# Patient Record
Sex: FEMALE | Race: WHITE | NOT HISPANIC OR LATINO | URBAN - METROPOLITAN AREA
[De-identification: names, ages, dates, MRNs, and addresses within clinical notes are randomized per-mention and may not be internally consistent; named-entity substitution may affect disease eponyms.]

---

## 2022-07-20 ENCOUNTER — OUTPATIENT (OUTPATIENT)
Dept: OUTPATIENT SERVICES | Facility: HOSPITAL | Age: 22
LOS: 1 days | End: 2022-07-20
Payer: COMMERCIAL

## 2022-07-20 VITALS
TEMPERATURE: 98 F | DIASTOLIC BLOOD PRESSURE: 77 MMHG | OXYGEN SATURATION: 98 % | WEIGHT: 255.07 LBS | HEART RATE: 87 BPM | HEIGHT: 69 IN | RESPIRATION RATE: 14 BRPM | SYSTOLIC BLOOD PRESSURE: 111 MMHG

## 2022-07-20 DIAGNOSIS — K08.409 PARTIAL LOSS OF TEETH, UNSPECIFIED CAUSE, UNSPECIFIED CLASS: Chronic | ICD-10-CM

## 2022-07-20 DIAGNOSIS — J35.01 CHRONIC TONSILLITIS: ICD-10-CM

## 2022-07-20 DIAGNOSIS — Z01.818 ENCOUNTER FOR OTHER PREPROCEDURAL EXAMINATION: ICD-10-CM

## 2022-07-20 LAB
ANION GAP SERPL CALC-SCNC: 5 MMOL/L — SIGNIFICANT CHANGE UP (ref 5–17)
APTT BLD: 33 SEC — SIGNIFICANT CHANGE UP (ref 27.5–35.5)
BUN SERPL-MCNC: 9 MG/DL — SIGNIFICANT CHANGE UP (ref 7–23)
CALCIUM SERPL-MCNC: 8.7 MG/DL — SIGNIFICANT CHANGE UP (ref 8.5–10.1)
CHLORIDE SERPL-SCNC: 107 MMOL/L — SIGNIFICANT CHANGE UP (ref 96–108)
CO2 SERPL-SCNC: 29 MMOL/L — SIGNIFICANT CHANGE UP (ref 22–31)
CREAT SERPL-MCNC: 0.76 MG/DL — SIGNIFICANT CHANGE UP (ref 0.5–1.3)
EGFR: 116 ML/MIN/1.73M2 — SIGNIFICANT CHANGE UP
GLUCOSE SERPL-MCNC: 85 MG/DL — SIGNIFICANT CHANGE UP (ref 70–99)
HCG SERPL-ACNC: <1 MIU/ML — SIGNIFICANT CHANGE UP
HCT VFR BLD CALC: 40.8 % — SIGNIFICANT CHANGE UP (ref 34.5–45)
HGB BLD-MCNC: 12.9 G/DL — SIGNIFICANT CHANGE UP (ref 11.5–15.5)
INR BLD: 1.08 RATIO — SIGNIFICANT CHANGE UP (ref 0.88–1.16)
MCHC RBC-ENTMCNC: 26.1 PG — LOW (ref 27–34)
MCHC RBC-ENTMCNC: 31.6 GM/DL — LOW (ref 32–36)
MCV RBC AUTO: 82.4 FL — SIGNIFICANT CHANGE UP (ref 80–100)
NRBC # BLD: 0 /100 WBCS — SIGNIFICANT CHANGE UP (ref 0–0)
PLATELET # BLD AUTO: 433 K/UL — HIGH (ref 150–400)
POTASSIUM SERPL-MCNC: 3.9 MMOL/L — SIGNIFICANT CHANGE UP (ref 3.5–5.3)
POTASSIUM SERPL-SCNC: 3.9 MMOL/L — SIGNIFICANT CHANGE UP (ref 3.5–5.3)
PROTHROM AB SERPL-ACNC: 12.6 SEC — SIGNIFICANT CHANGE UP (ref 10.5–13.4)
RBC # BLD: 4.95 M/UL — SIGNIFICANT CHANGE UP (ref 3.8–5.2)
RBC # FLD: 13.4 % — SIGNIFICANT CHANGE UP (ref 10.3–14.5)
SARS-COV-2 RNA SPEC QL NAA+PROBE: SIGNIFICANT CHANGE UP
SODIUM SERPL-SCNC: 141 MMOL/L — SIGNIFICANT CHANGE UP (ref 135–145)
WBC # BLD: 10.1 K/UL — SIGNIFICANT CHANGE UP (ref 3.8–10.5)
WBC # FLD AUTO: 10.1 K/UL — SIGNIFICANT CHANGE UP (ref 3.8–10.5)

## 2022-07-20 PROCEDURE — 36415 COLL VENOUS BLD VENIPUNCTURE: CPT

## 2022-07-20 PROCEDURE — U0003: CPT

## 2022-07-20 PROCEDURE — G0463: CPT

## 2022-07-20 PROCEDURE — 85730 THROMBOPLASTIN TIME PARTIAL: CPT

## 2022-07-20 PROCEDURE — 85027 COMPLETE CBC AUTOMATED: CPT

## 2022-07-20 PROCEDURE — 80048 BASIC METABOLIC PNL TOTAL CA: CPT

## 2022-07-20 PROCEDURE — 84702 CHORIONIC GONADOTROPIN TEST: CPT

## 2022-07-20 PROCEDURE — U0005: CPT

## 2022-07-20 PROCEDURE — 85610 PROTHROMBIN TIME: CPT

## 2022-07-20 NOTE — H&P PST ADULT - LAST STRESS TEST
Thinks she had stress test  a few years ago - with Dr Edwin Diaz done due to palpitations and it was fine

## 2022-07-20 NOTE — H&P PST ADULT - PROBLEM SELECTOR PLAN 1
PST labs; CBC, BMP, Coags, HcG, COVID-19 PCR. Medical clearance on chart. Pt instructed to stop any NSAIDS/Herbal Supplements between now and procedure. May take Tylenol if needed for any pain between now and procedure. Morning of procedure she may take her Zyrtec with small sip of water. Pre-op instructions given to pt with understanding verbalized. All questions addressed with patient prior to her leaving the PST department today.

## 2022-07-20 NOTE — H&P PST ADULT - FALL HARM RISK - UNIVERSAL INTERVENTIONS
Bed in lowest position, wheels locked, appropriate side rails in place/Call bell, personal items and telephone in reach/Instruct patient to call for assistance before getting out of bed or chair/Non-slip footwear when patient is out of bed/[place] to call system/Physically safe environment - no spills, clutter or unnecessary equipment/Purposeful Proactive Rounding/Room/bathroom lighting operational, light cord in reach

## 2022-07-20 NOTE — H&P PST ADULT - NSICDXFAMILYHX_GEN_ALL_CORE_FT
FAMILY HISTORY:  Father  Still living? Yes, Estimated age: 41-50  Family history of hypercholesterolemia, Age at diagnosis: Age Unknown    Mother  Still living? Yes, Estimated age: 41-50  Family history of attention deficit hyperactivity disorder (ADHD), Age at diagnosis: Age Unknown

## 2022-07-20 NOTE — H&P PST ADULT - REASON FOR ADMISSION
Pt states " I am having a Tonsillectomy." Path Notes (To The Dermatopathologist): check margins\\nsuture lateral pole

## 2022-07-20 NOTE — H&P PST ADULT - STREET DRUG/MEDICATION/INHALANT, DURATION OF USE, PROFILE
Notes she stopped any Marijuana 3 months ago - prior to that she would use edibles once a month (notes was using it for pain and appetite)

## 2022-07-20 NOTE — H&P PST ADULT - NSICDXPASTMEDICALHX_GEN_ALL_CORE_FT
PAST MEDICAL HISTORY:  Anemia     Back pain     Bilateral dry eyes     Chronic tonsillitis     Dizziness     Fibromyalgia     Gastritis     H/O irritable bowel syndrome     Headache     Heartburn     High blood pressure Notes back in 2017 when she was in the hospital for  depression/anxiety  which it has since resolved    History of heart murmur in childhood     Migraines     Muscle cramps     Nausea     Numbness Notes Intermittent Numbness in arms and legs    HALLIE (obstructive sleep apnea) Does NOT currently use a CPAP machine    Palpitations     PCOS (polycystic ovarian syndrome)     Thyroid nodule     Vertigo

## 2022-07-20 NOTE — H&P PST ADULT - ASSESSMENT
21 year old female PMH HALLIE (DX may 2021 -does not use CPAP Machine), PCOS, Fibromyalgia, IBS, Thyroid Nodule, HTN (notes has since resolved), Childhood Heart Murmur, SOB (Due to Enlarged Tonsils and cat allergy- lives with cats), Vertigo, Migraines, Dry Eyes, Anemia, Heartburn, Back Pain; also with Chronic Tonsillitis; scheduled for Tonsillectomy with Dr Gigi Donohue on 7/25/2022.

## 2022-07-20 NOTE — H&P PST ADULT - RESPIRATORY
clear to auscultation bilaterally/no respiratory distress/airway patent/breath sounds equal/good air movement/respirations non-labored

## 2022-07-20 NOTE — H&P PST ADULT - HISTORY OF PRESENT ILLNESS
21 year old female PMH HALLIE (DX may 2021 -does not use CPAP Machine), PCOS, Fibromyalgia, IBS, Thyroid Nodule, HTN (notes has since resolved), Childhood Heart Murmur, SOB (Due to Enlarged Tonsils and cat allergy- lives with cats), Vertigo, Migraines, Dry Eyes, Anemia, Heartburn, Back Pain; also with Chronic Tonsillitis; presents today for PST prior to Tonsillectomy with Dr Gigi Donohue on 7/25/2022.  Pt notes H/O [place] Tonsillitis as well as enlarged tonsils which she notes makes it difficult to breath. Following consult, exam and discussions with Dr Donohue regarding treatment options pt is electing for scheduled procedure.

## 2022-07-25 ENCOUNTER — OUTPATIENT (OUTPATIENT)
Dept: OUTPATIENT SERVICES | Facility: HOSPITAL | Age: 22
LOS: 1 days | End: 2022-07-25
Payer: COMMERCIAL

## 2022-07-25 VITALS
HEART RATE: 82 BPM | RESPIRATION RATE: 18 BRPM | OXYGEN SATURATION: 99 % | DIASTOLIC BLOOD PRESSURE: 71 MMHG | SYSTOLIC BLOOD PRESSURE: 139 MMHG | TEMPERATURE: 98 F

## 2022-07-25 VITALS
OXYGEN SATURATION: 98 % | HEART RATE: 75 BPM | DIASTOLIC BLOOD PRESSURE: 52 MMHG | SYSTOLIC BLOOD PRESSURE: 117 MMHG | RESPIRATION RATE: 13 BRPM

## 2022-07-25 DIAGNOSIS — K08.409 PARTIAL LOSS OF TEETH, UNSPECIFIED CAUSE, UNSPECIFIED CLASS: Chronic | ICD-10-CM

## 2022-07-25 DIAGNOSIS — J35.01 CHRONIC TONSILLITIS: ICD-10-CM

## 2022-07-25 PROCEDURE — 88304 TISSUE EXAM BY PATHOLOGIST: CPT

## 2022-07-25 PROCEDURE — 42826 REMOVAL OF TONSILS: CPT

## 2022-07-25 PROCEDURE — C1889: CPT

## 2022-07-25 PROCEDURE — 88304 TISSUE EXAM BY PATHOLOGIST: CPT | Mod: 26

## 2022-07-25 DEVICE — SURGIFLO MATRIX WITH THROMBIN KIT: Type: IMPLANTABLE DEVICE | Site: BILATERAL | Status: FUNCTIONAL

## 2022-07-25 RX ORDER — ONDANSETRON 8 MG/1
4 TABLET, FILM COATED ORAL ONCE
Refills: 0 | Status: DISCONTINUED | OUTPATIENT
Start: 2022-07-25 | End: 2022-07-25

## 2022-07-25 RX ORDER — SODIUM CHLORIDE 9 MG/ML
1000 INJECTION, SOLUTION INTRAVENOUS
Refills: 0 | Status: DISCONTINUED | OUTPATIENT
Start: 2022-07-25 | End: 2022-07-25

## 2022-07-25 RX ORDER — HYDROMORPHONE HYDROCHLORIDE 2 MG/ML
0.5 INJECTION INTRAMUSCULAR; INTRAVENOUS; SUBCUTANEOUS
Refills: 0 | Status: DISCONTINUED | OUTPATIENT
Start: 2022-07-25 | End: 2022-07-25

## 2022-07-25 RX ORDER — FLUTICASONE PROPIONATE 50 MCG
2 SPRAY, SUSPENSION NASAL
Qty: 0 | Refills: 0 | DISCHARGE

## 2022-07-25 RX ORDER — ACETAMINOPHEN 500 MG
1000 TABLET ORAL ONCE
Refills: 0 | Status: COMPLETED | OUTPATIENT
Start: 2022-07-25 | End: 2022-07-25

## 2022-07-25 RX ORDER — CETIRIZINE HYDROCHLORIDE 10 MG/1
1 TABLET ORAL
Qty: 0 | Refills: 0 | DISCHARGE

## 2022-07-25 RX ORDER — HYDROMORPHONE HYDROCHLORIDE 2 MG/ML
1 INJECTION INTRAMUSCULAR; INTRAVENOUS; SUBCUTANEOUS
Refills: 0 | Status: DISCONTINUED | OUTPATIENT
Start: 2022-07-25 | End: 2022-07-25

## 2022-07-25 RX ORDER — AMOXICILLIN 250 MG/5ML
10 SUSPENSION, RECONSTITUTED, ORAL (ML) ORAL
Qty: 0 | Refills: 0 | DISCHARGE

## 2022-07-25 RX ORDER — CEFAZOLIN SODIUM 1 G
2000 VIAL (EA) INJECTION ONCE
Refills: 0 | Status: DISCONTINUED | OUTPATIENT
Start: 2022-07-25 | End: 2022-08-08

## 2022-07-25 RX ORDER — DIPHENHYDRAMINE HCL 50 MG
50 CAPSULE ORAL
Qty: 0 | Refills: 0 | DISCHARGE

## 2022-07-25 RX ORDER — ACETAMINOPHEN 500 MG
2 TABLET ORAL
Qty: 0 | Refills: 0 | DISCHARGE

## 2022-07-25 RX ADMIN — Medication 400 MILLIGRAM(S): at 10:03

## 2022-07-25 RX ADMIN — SODIUM CHLORIDE 75 MILLILITER(S): 9 INJECTION, SOLUTION INTRAVENOUS at 08:59

## 2022-07-25 NOTE — ASU DISCHARGE PLAN (ADULT/PEDIATRIC) - NS MD DC FALL RISK RISK
For information on Fall & Injury Prevention, visit: [email or web address] OR  [email or web address] OR  https://www.cdc.gov/steadi/patient.html

## 2023-04-25 NOTE — H&P PST ADULT - TEMPERATURE IN CELSIUS (DEGREES C)
36.9 Secondary Intention Text (Leave Blank If You Do Not Want): The defect will heal with secondary intention.

## 2024-09-26 PROBLEM — R42 DIZZINESS AND GIDDINESS: Chronic | Status: ACTIVE | Noted: 2022-07-20

## 2024-09-26 PROBLEM — I10 ESSENTIAL (PRIMARY) HYPERTENSION: Chronic | Status: ACTIVE | Noted: 2022-07-20

## 2024-09-26 PROBLEM — K29.70 GASTRITIS, UNSPECIFIED, WITHOUT BLEEDING: Chronic | Status: ACTIVE | Noted: 2022-07-20

## 2024-09-26 PROBLEM — M79.7 FIBROMYALGIA: Chronic | Status: ACTIVE | Noted: 2022-07-20

## 2024-09-26 PROBLEM — J35.01 CHRONIC TONSILLITIS: Chronic | Status: ACTIVE | Noted: 2022-07-20

## 2024-09-26 PROBLEM — R12 HEARTBURN: Chronic | Status: ACTIVE | Noted: 2022-07-20

## 2024-09-26 PROBLEM — R00.2 PALPITATIONS: Chronic | Status: ACTIVE | Noted: 2022-07-20

## 2024-09-26 PROBLEM — H04.123 DRY EYE SYNDROME OF BILATERAL LACRIMAL GLANDS: Chronic | Status: ACTIVE | Noted: 2022-07-20

## 2024-09-26 PROBLEM — E28.2 POLYCYSTIC OVARIAN SYNDROME: Chronic | Status: ACTIVE | Noted: 2022-07-20

## 2024-09-26 PROBLEM — Z87.19 PERSONAL HISTORY OF OTHER DISEASES OF THE DIGESTIVE SYSTEM: Chronic | Status: ACTIVE | Noted: 2022-07-20

## 2024-09-26 PROBLEM — R20.0 ANESTHESIA OF SKIN: Chronic | Status: ACTIVE | Noted: 2022-07-20

## 2024-09-26 PROBLEM — R11.0 NAUSEA: Chronic | Status: ACTIVE | Noted: 2022-07-20

## 2024-09-26 PROBLEM — M54.9 DORSALGIA, UNSPECIFIED: Chronic | Status: ACTIVE | Noted: 2022-07-20

## 2024-09-26 PROBLEM — Z86.79 PERSONAL HISTORY OF OTHER DISEASES OF THE CIRCULATORY SYSTEM: Chronic | Status: ACTIVE | Noted: 2022-07-20

## 2024-09-26 PROBLEM — G43.909 MIGRAINE, UNSPECIFIED, NOT INTRACTABLE, WITHOUT STATUS MIGRAINOSUS: Chronic | Status: ACTIVE | Noted: 2022-07-20

## 2024-09-26 PROBLEM — R51.9 HEADACHE, UNSPECIFIED: Chronic | Status: ACTIVE | Noted: 2022-07-20

## 2024-09-26 PROBLEM — E04.1 NONTOXIC SINGLE THYROID NODULE: Chronic | Status: ACTIVE | Noted: 2022-07-20

## 2024-09-26 PROBLEM — R25.2 CRAMP AND SPASM: Chronic | Status: ACTIVE | Noted: 2022-07-20

## 2024-09-26 PROBLEM — G47.33 OBSTRUCTIVE SLEEP APNEA (ADULT) (PEDIATRIC): Chronic | Status: ACTIVE | Noted: 2022-07-20

## 2024-09-26 PROBLEM — D64.9 ANEMIA, UNSPECIFIED: Chronic | Status: ACTIVE | Noted: 2022-07-20

## 2024-10-24 ENCOUNTER — OUTPATIENT (OUTPATIENT)
Dept: OUTPATIENT SERVICES | Facility: HOSPITAL | Age: 24
LOS: 1 days | Discharge: HOME | End: 2024-10-24
Payer: COMMERCIAL

## 2024-10-24 VITALS
HEART RATE: 101 BPM | OXYGEN SATURATION: 100 % | WEIGHT: 293 LBS | HEIGHT: 68 IN | RESPIRATION RATE: 17 BRPM | SYSTOLIC BLOOD PRESSURE: 125 MMHG | TEMPERATURE: 99 F | DIASTOLIC BLOOD PRESSURE: 71 MMHG

## 2024-10-24 DIAGNOSIS — K62.5 HEMORRHAGE OF ANUS AND RECTUM: ICD-10-CM

## 2024-10-24 DIAGNOSIS — K08.409 PARTIAL LOSS OF TEETH, UNSPECIFIED CAUSE, UNSPECIFIED CLASS: Chronic | ICD-10-CM

## 2024-10-24 LAB — HCG UR QL: NEGATIVE — SIGNIFICANT CHANGE UP

## 2024-10-24 PROCEDURE — 88312 SPECIAL STAINS GROUP 1: CPT | Mod: 26

## 2024-10-24 PROCEDURE — 88312 SPECIAL STAINS GROUP 1: CPT

## 2024-10-24 PROCEDURE — 88305 TISSUE EXAM BY PATHOLOGIST: CPT

## 2024-10-24 PROCEDURE — 88313 SPECIAL STAINS GROUP 2: CPT | Mod: 26

## 2024-10-24 PROCEDURE — 81025 URINE PREGNANCY TEST: CPT

## 2024-10-24 PROCEDURE — 88305 TISSUE EXAM BY PATHOLOGIST: CPT | Mod: 26

## 2024-10-24 PROCEDURE — 88313 SPECIAL STAINS GROUP 2: CPT

## 2024-10-24 RX ORDER — DEXTROAMPHETAMINE SULFATE, DEXTROAMPHETAMINE SACCHARATE, AMPHETAMINE SULFATE AND AMPHETAMINE ASPARTATE 6.25; 6.25; 6.25; 6.25 MG/1; MG/1; MG/1; MG/1
0 CAPSULE, EXTENDED RELEASE ORAL
Refills: 0 | DISCHARGE

## 2024-10-24 RX ORDER — METOPROLOL TARTRATE 50 MG
0 TABLET ORAL
Refills: 0 | DISCHARGE

## 2024-10-24 NOTE — ASU PATIENT PROFILE, ADULT - STREET DRUG/MEDICATION/INHALANT, DURATION OF USE, PROFILE
Notes she stopped any Marijuana - prior to that she would use edibles once a month (notes was using it for pain and appetite)

## 2024-10-24 NOTE — ASU PATIENT PROFILE, ADULT - PATIENT REPRESENTATIVE: ( YOU CAN CHOOSE ANY PERSON THAT CAN ASSIST YOU WITH YOUR HEALTH CARE PREFERENCES, DOES NOT HAVE TO BE A SPOUSE, IMMEDIATE FAMILY OR SIGNIFICANT OTHER/PARTNER)
Yes CCHD Screen [08-02]: Initial  Pre-Ductal SpO2(%): 100  Post-Ductal SpO2(%): 100  SpO2 Difference(Pre MINUS Post): 0  Extremities Used: Right Hand, Right Foot  Result: Passed  Follow up: Normal Screen- (No follow-up needed)

## 2024-10-24 NOTE — ASU PATIENT PROFILE, ADULT - VISION (WITH CORRECTIVE LENSES IF THE PATIENT USUALLY WEARS THEM):
Pt up to restroom Normal vision: sees adequately in most situations; can see medication labels, newsprint

## 2024-10-29 LAB — SURGICAL PATHOLOGY STUDY: SIGNIFICANT CHANGE UP

## 2024-10-31 DIAGNOSIS — K31.89 OTHER DISEASES OF STOMACH AND DUODENUM: ICD-10-CM

## 2024-10-31 DIAGNOSIS — G47.33 OBSTRUCTIVE SLEEP APNEA (ADULT) (PEDIATRIC): ICD-10-CM

## 2024-10-31 DIAGNOSIS — R10.9 UNSPECIFIED ABDOMINAL PAIN: ICD-10-CM

## 2024-10-31 DIAGNOSIS — K64.4 RESIDUAL HEMORRHOIDAL SKIN TAGS: ICD-10-CM

## 2025-01-28 PROBLEM — Z00.00 ENCOUNTER FOR PREVENTIVE HEALTH EXAMINATION: Status: ACTIVE | Noted: 2025-01-28

## 2025-01-29 ENCOUNTER — APPOINTMENT (OUTPATIENT)
Dept: GASTROENTEROLOGY | Facility: CLINIC | Age: 25
End: 2025-01-29
Payer: COMMERCIAL

## 2025-01-29 ENCOUNTER — APPOINTMENT (OUTPATIENT)
Dept: COLORECTAL SURGERY | Facility: CLINIC | Age: 25
End: 2025-01-29
Payer: COMMERCIAL

## 2025-01-29 VITALS
HEIGHT: 68 IN | OXYGEN SATURATION: 97 % | SYSTOLIC BLOOD PRESSURE: 128 MMHG | HEART RATE: 80 BPM | WEIGHT: 293 LBS | RESPIRATION RATE: 16 BRPM | DIASTOLIC BLOOD PRESSURE: 70 MMHG | BODY MASS INDEX: 44.41 KG/M2

## 2025-01-29 DIAGNOSIS — K64.9 UNSPECIFIED HEMORRHOIDS: ICD-10-CM

## 2025-01-29 DIAGNOSIS — Z82.49 FAMILY HISTORY OF ISCHEMIC HEART DISEASE AND OTHER DISEASES OF THE CIRCULATORY SYSTEM: ICD-10-CM

## 2025-01-29 DIAGNOSIS — Z83.49 FAMILY HISTORY OF OTHER ENDOCRINE, NUTRITIONAL AND METABOLIC DISEASES: ICD-10-CM

## 2025-01-29 DIAGNOSIS — Z87.19 PERSONAL HISTORY OF OTHER DISEASES OF THE DIGESTIVE SYSTEM: ICD-10-CM

## 2025-01-29 DIAGNOSIS — Z86.79 PERSONAL HISTORY OF OTHER DISEASES OF THE CIRCULATORY SYSTEM: ICD-10-CM

## 2025-01-29 DIAGNOSIS — Z83.438 FAMILY HISTORY OF OTHER DISORDER OF LIPOPROTEIN METABOLISM AND OTHER LIPIDEMIA: ICD-10-CM

## 2025-01-29 DIAGNOSIS — Z83.3 FAMILY HISTORY OF DIABETES MELLITUS: ICD-10-CM

## 2025-01-29 DIAGNOSIS — K60.2 ANAL FISSURE, UNSPECIFIED: ICD-10-CM

## 2025-01-29 DIAGNOSIS — K62.5 HEMORRHAGE OF ANUS AND RECTUM: ICD-10-CM

## 2025-01-29 PROCEDURE — 99204 OFFICE O/P NEW MOD 45 MIN: CPT

## 2025-01-29 PROCEDURE — 99203 OFFICE O/P NEW LOW 30 MIN: CPT

## 2025-01-29 RX ORDER — CETIRIZINE HCL 10 MG
TABLET ORAL
Refills: 0 | Status: ACTIVE | COMMUNITY

## 2025-01-29 RX ORDER — MULTIVIT-MIN/IRON/FOLIC ACID/K 18-600-40
CAPSULE ORAL
Refills: 0 | Status: ACTIVE | COMMUNITY

## 2025-01-29 RX ORDER — MILNACIPRAN HYDROCHLORIDE 25 MG/1
25 TABLET, FILM COATED ORAL
Refills: 0 | Status: ACTIVE | COMMUNITY

## 2025-01-29 RX ORDER — DROSPIRENONE 4 MG/1
TABLET, FILM COATED ORAL
Refills: 0 | Status: ACTIVE | COMMUNITY

## 2025-01-31 ENCOUNTER — TRANSCRIPTION ENCOUNTER (OUTPATIENT)
Age: 25
End: 2025-01-31

## 2025-03-17 ENCOUNTER — APPOINTMENT (OUTPATIENT)
Dept: COLORECTAL SURGERY | Facility: CLINIC | Age: 25
End: 2025-03-17
Payer: COMMERCIAL

## 2025-03-17 VITALS
HEIGHT: 68 IN | BODY MASS INDEX: 44.41 KG/M2 | HEART RATE: 109 BPM | DIASTOLIC BLOOD PRESSURE: 81 MMHG | SYSTOLIC BLOOD PRESSURE: 125 MMHG | WEIGHT: 293 LBS | RESPIRATION RATE: 14 BRPM | OXYGEN SATURATION: 97 % | TEMPERATURE: 98 F

## 2025-03-17 DIAGNOSIS — K92.1 MELENA: ICD-10-CM

## 2025-03-17 DIAGNOSIS — Z87.898 PERSONAL HISTORY OF OTHER SPECIFIED CONDITIONS: ICD-10-CM

## 2025-03-17 DIAGNOSIS — R14.0 ABDOMINAL DISTENSION (GASEOUS): ICD-10-CM

## 2025-03-17 DIAGNOSIS — Z78.9 OTHER SPECIFIED HEALTH STATUS: ICD-10-CM

## 2025-03-17 DIAGNOSIS — K60.2 ANAL FISSURE, UNSPECIFIED: ICD-10-CM

## 2025-03-17 DIAGNOSIS — D68.9 COAGULATION DEFECT, UNSPECIFIED: ICD-10-CM

## 2025-03-17 DIAGNOSIS — Z86.39 PERSONAL HISTORY OF OTHER ENDOCRINE, NUTRITIONAL AND METABOLIC DISEASE: ICD-10-CM

## 2025-03-17 DIAGNOSIS — E28.2 POLYCYSTIC OVARIAN SYNDROME: ICD-10-CM

## 2025-03-17 DIAGNOSIS — Z87.19 PERSONAL HISTORY OF OTHER DISEASES OF THE DIGESTIVE SYSTEM: ICD-10-CM

## 2025-03-17 DIAGNOSIS — R19.8 OTHER SPECIFIED SYMPTOMS AND SIGNS INVOLVING THE DIGESTIVE SYSTEM AND ABDOMEN: ICD-10-CM

## 2025-03-17 DIAGNOSIS — F43.10 POST-TRAUMATIC STRESS DISORDER, UNSPECIFIED: ICD-10-CM

## 2025-03-17 PROCEDURE — 99214 OFFICE O/P EST MOD 30 MIN: CPT

## 2025-03-25 PROBLEM — Z78.9 NEVER SMOKED CIGARETTES: Status: ACTIVE | Noted: 2025-03-25

## 2025-03-25 PROBLEM — K92.1 BLOOD IN STOOL: Status: RESOLVED | Noted: 2025-03-25 | Resolved: 2025-03-25

## 2025-03-25 PROBLEM — Z78.9 DENIES ALCOHOL CONSUMPTION: Status: ACTIVE | Noted: 2025-03-25

## 2025-03-25 PROBLEM — E28.2 PCOS (POLYCYSTIC OVARIAN SYNDROME): Status: RESOLVED | Noted: 2025-03-25 | Resolved: 2025-03-25

## 2025-03-25 PROBLEM — Z87.898 HISTORY OF NAUSEA: Status: RESOLVED | Noted: 2025-03-25 | Resolved: 2025-03-25

## 2025-03-25 PROBLEM — R19.8 PAIN WITH BOWEL MOVEMENTS: Status: RESOLVED | Noted: 2025-03-25 | Resolved: 2025-03-25

## 2025-03-25 PROBLEM — Z78.9 DOES NOT USE ILLICIT DRUGS: Status: ACTIVE | Noted: 2025-03-25

## 2025-03-25 PROBLEM — F43.10 PTSD (POST-TRAUMATIC STRESS DISORDER): Status: RESOLVED | Noted: 2025-03-25 | Resolved: 2025-03-25

## 2025-03-25 PROBLEM — Z87.19 HISTORY OF RECTAL BLEEDING: Status: RESOLVED | Noted: 2025-03-25 | Resolved: 2025-03-25

## 2025-03-25 PROBLEM — R14.0 BLOATING: Status: RESOLVED | Noted: 2025-03-25 | Resolved: 2025-03-25

## 2025-03-25 PROBLEM — D68.9 BLOOD CLOTTING DISORDER: Status: RESOLVED | Noted: 2025-03-25 | Resolved: 2025-03-25

## 2025-03-25 PROBLEM — Z87.898 HISTORY OF HEARTBURN: Status: RESOLVED | Noted: 2025-03-25 | Resolved: 2025-03-25

## 2025-03-25 PROBLEM — Z86.39 HISTORY OF THYROID DISORDER: Status: RESOLVED | Noted: 2025-03-25 | Resolved: 2025-03-25

## 2025-03-31 ENCOUNTER — NON-APPOINTMENT (OUTPATIENT)
Age: 25
End: 2025-03-31

## 2025-03-31 ENCOUNTER — APPOINTMENT (OUTPATIENT)
Dept: COLORECTAL SURGERY | Facility: CLINIC | Age: 25
End: 2025-03-31
Payer: COMMERCIAL

## 2025-03-31 DIAGNOSIS — K61.0 ANAL ABSCESS: ICD-10-CM

## 2025-03-31 PROCEDURE — 99214 OFFICE O/P EST MOD 30 MIN: CPT

## 2025-04-01 ENCOUNTER — NON-APPOINTMENT (OUTPATIENT)
Age: 25
End: 2025-04-01

## 2025-04-09 ENCOUNTER — APPOINTMENT (OUTPATIENT)
Dept: COLORECTAL SURGERY | Facility: HOSPITAL | Age: 25
End: 2025-04-09

## 2025-04-09 ENCOUNTER — TRANSCRIPTION ENCOUNTER (OUTPATIENT)
Age: 25
End: 2025-04-09

## 2025-04-09 ENCOUNTER — OUTPATIENT (OUTPATIENT)
Dept: INPATIENT UNIT | Facility: HOSPITAL | Age: 25
LOS: 1 days | Discharge: HOME | End: 2025-04-09
Payer: COMMERCIAL

## 2025-04-09 VITALS
RESPIRATION RATE: 16 BRPM | OXYGEN SATURATION: 99 % | HEART RATE: 87 BPM | DIASTOLIC BLOOD PRESSURE: 91 MMHG | HEIGHT: 68 IN | WEIGHT: 293 LBS | SYSTOLIC BLOOD PRESSURE: 140 MMHG | TEMPERATURE: 99 F

## 2025-04-09 VITALS
OXYGEN SATURATION: 99 % | SYSTOLIC BLOOD PRESSURE: 112 MMHG | RESPIRATION RATE: 16 BRPM | HEART RATE: 79 BPM | DIASTOLIC BLOOD PRESSURE: 56 MMHG | TEMPERATURE: 98 F

## 2025-04-09 DIAGNOSIS — K08.409 PARTIAL LOSS OF TEETH, UNSPECIFIED CAUSE, UNSPECIFIED CLASS: Chronic | ICD-10-CM

## 2025-04-09 DIAGNOSIS — Z90.89 ACQUIRED ABSENCE OF OTHER ORGANS: Chronic | ICD-10-CM

## 2025-04-09 DIAGNOSIS — K61.0 ANAL ABSCESS: ICD-10-CM

## 2025-04-09 DIAGNOSIS — K60.2 ANAL FISSURE, UNSPECIFIED: ICD-10-CM

## 2025-04-09 DIAGNOSIS — Z98.890 OTHER SPECIFIED POSTPROCEDURAL STATES: Chronic | ICD-10-CM

## 2025-04-09 LAB — HCG SERPL-ACNC: <1 MIU/ML — SIGNIFICANT CHANGE UP

## 2025-04-09 PROCEDURE — 36415 COLL VENOUS BLD VENIPUNCTURE: CPT

## 2025-04-09 PROCEDURE — 84702 CHORIONIC GONADOTROPIN TEST: CPT

## 2025-04-09 PROCEDURE — 46275 REMOVE ANAL FIST INTER: CPT

## 2025-04-09 RX ORDER — HYDROMORPHONE/SOD CHLOR,ISO/PF 2 MG/10 ML
0.5 SYRINGE (ML) INJECTION
Refills: 0 | Status: DISCONTINUED | OUTPATIENT
Start: 2025-04-09 | End: 2025-04-09

## 2025-04-09 RX ORDER — FENTANYL CITRATE-0.9 % NACL/PF 100MCG/2ML
50 SYRINGE (ML) INTRAVENOUS
Refills: 0 | Status: DISCONTINUED | OUTPATIENT
Start: 2025-04-09 | End: 2025-04-09

## 2025-04-09 RX ORDER — ACETAMINOPHEN 500 MG/5ML
2 LIQUID (ML) ORAL
Qty: 80 | Refills: 0
Start: 2025-04-09 | End: 2025-04-18

## 2025-04-09 RX ORDER — ONDANSETRON HCL/PF 4 MG/2 ML
4 VIAL (ML) INJECTION ONCE
Refills: 0 | Status: DISCONTINUED | OUTPATIENT
Start: 2025-04-09 | End: 2025-04-09

## 2025-04-09 RX ORDER — OXYCODONE HYDROCHLORIDE 30 MG/1
1 TABLET ORAL
Qty: 20 | Refills: 0
Start: 2025-04-09

## 2025-04-09 RX ORDER — OXYCODONE HYDROCHLORIDE 30 MG/1
5 TABLET ORAL ONCE
Refills: 0 | Status: DISCONTINUED | OUTPATIENT
Start: 2025-04-09 | End: 2025-04-09

## 2025-04-09 RX ORDER — MILNACIPRAN HYDROCHLORIDE 50 MG/1
1 TABLET, FILM COATED ORAL
Refills: 0 | DISCHARGE

## 2025-04-09 RX ORDER — SODIUM CHLORIDE 9 G/1000ML
1000 INJECTION, SOLUTION INTRAVENOUS
Refills: 0 | Status: DISCONTINUED | OUTPATIENT
Start: 2025-04-09 | End: 2025-04-09

## 2025-04-09 RX ORDER — DROSPIRENONE 4 MG/1
1 TABLET, FILM COATED ORAL
Refills: 0 | DISCHARGE

## 2025-04-09 NOTE — BRIEF OPERATIVE NOTE - NSICDXBRIEFPREOP_GEN_ALL_CORE_FT
PRE-OP DIAGNOSIS:  Anal fissure 09-Apr-2025 14:32:03  Raphael Mayorga   PRE-OP DIAGNOSIS:  Perianal abscess 09-Apr-2025 15:14:36  Santa Martinez

## 2025-04-09 NOTE — ASU DISCHARGE PLAN (ADULT/PEDIATRIC) - FINANCIAL ASSISTANCE
[facility] provides services at a reduced cost to those who are determined to be eligible through [facility]’s financial assistance program. Information regarding [facility]’s financial assistance program can be found by going to [email or web address] or by calling [phone].

## 2025-04-09 NOTE — ASU PATIENT PROFILE, ADULT - FALL HARM RISK - UNIVERSAL INTERVENTIONS
Bed in lowest position, wheels locked, appropriate side rails in place/Call bell, personal items and telephone in reach/Instruct patient to call for assistance before getting out of bed or chair/Non-slip footwear when patient is out of bed/[place] to call system/Physically safe environment - no spills, clutter or unnecessary equipment/Purposeful Proactive Rounding/Room/bathroom lighting operational, light cord in reach

## 2025-04-09 NOTE — BRIEF OPERATIVE NOTE - NSICDXBRIEFPROCEDURE_GEN_ALL_CORE_FT
PROCEDURES:  Exam under anesthesia, rectum, with fistulotomy, hemorrhoidectomy, or anal sphincterotomy, or any combination if indicated 09-Apr-2025 14:31:53  Raphael Mayorga

## 2025-04-09 NOTE — ASU PATIENT PROFILE, ADULT - NSICDXPASTSURGICALHX_GEN_ALL_CORE_FT
PAST SURGICAL HISTORY:  H/O colonoscopy     H/O wisdom tooth extraction     History of tonsillectomy

## 2025-04-09 NOTE — ASU PATIENT PROFILE, ADULT - NSICDXPASTMEDICALHX_GEN_ALL_CORE_FT
PAST MEDICAL HISTORY:  Anemia     Back pain     Bilateral dry eyes     Chronic tonsillitis     Dizziness     Fibromyalgia     Gastritis     H/O irritable bowel syndrome     Headache     Heartburn     High blood pressure Notes back in 2017 when she was in the hospital for  depression/anxiety  which it has since resolved    History of heart murmur in childhood     History of posttraumatic stress disorder (PTSD)     Migraines     Muscle cramps     Nausea     Numbness Notes Intermittent Numbness in arms and legs    HALLIE (obstructive sleep apnea) Does NOT currently use a CPAP machine-    Palpitations     PCOS (polycystic ovarian syndrome)     Thyroid nodule     Vertigo

## 2025-04-09 NOTE — ASU DISCHARGE PLAN (ADULT/PEDIATRIC) - ASU DC SPECIAL INSTRUCTIONSFT
Remove all gauze and shower with soap and water. apply new gauze daily and as needed. May use warm water to soak two-three times per day. may take colace and/or milk of magnesia as needed for constipation. ice packs to area as needed. May take Tylenol and/or motrin for mild pain.     Take metamucil powder, fiber  daily with plenty of water. may use bacitracin ointment or Vitamin A&D ointment as needed to area.    Expect rectal bleeding on/off for the next 6-8 weeks. Expect  intermittent rectal pain and soreness for the next 3-4 weeks.     FOLLOW UP IN 4 WEEKS.

## 2025-04-09 NOTE — BRIEF OPERATIVE NOTE - NSICDXBRIEFPOSTOP_GEN_ALL_CORE_FT
POST-OP DIAGNOSIS:  Anal fissure 09-Apr-2025 14:32:23  Raphael Mayorga   POST-OP DIAGNOSIS:  Perianal abscess 09-Apr-2025 15:14:47  Santa Martinez

## 2025-04-09 NOTE — ASU DISCHARGE PLAN (ADULT/PEDIATRIC) - CARE PROVIDER_API CALL
Santa Martinez  Colon/Rectal Surgery  [address], [place] [address]  Phone: [phone]  Fax: [phone]  Follow Up Time: 1 month

## 2025-04-09 NOTE — ASU DISCHARGE PLAN (ADULT/PEDIATRIC) - NS MD DC FALL RISK RISK
For information on Fall & Injury Prevention, visit: [email or web address] OR  [email or web address] OR  https://www.cdc.gov/steadi/patient.html

## 2025-04-11 PROBLEM — Z86.59 PERSONAL HISTORY OF OTHER MENTAL AND BEHAVIORAL DISORDERS: Chronic | Status: ACTIVE | Noted: 2025-04-09

## 2025-04-15 DIAGNOSIS — K60.30 ANAL FISTULA, UNSPECIFIED: ICD-10-CM

## 2025-04-15 DIAGNOSIS — K61.0 ANAL ABSCESS: ICD-10-CM

## 2025-04-15 DIAGNOSIS — I10 ESSENTIAL (PRIMARY) HYPERTENSION: ICD-10-CM

## 2025-04-15 DIAGNOSIS — Z79.3 LONG TERM (CURRENT) USE OF HORMONAL CONTRACEPTIVES: ICD-10-CM

## 2025-04-15 DIAGNOSIS — K60.1 CHRONIC ANAL FISSURE: ICD-10-CM

## 2025-04-15 DIAGNOSIS — M79.7 FIBROMYALGIA: ICD-10-CM

## 2025-04-21 ENCOUNTER — APPOINTMENT (OUTPATIENT)
Dept: COLORECTAL SURGERY | Facility: CLINIC | Age: 25
End: 2025-04-21

## 2025-04-23 ENCOUNTER — APPOINTMENT (OUTPATIENT)
Dept: COLORECTAL SURGERY | Facility: AMBULATORY SURGERY CENTER | Age: 25
End: 2025-04-23

## 2025-05-09 ENCOUNTER — APPOINTMENT (OUTPATIENT)
Dept: COLORECTAL SURGERY | Facility: CLINIC | Age: 25
End: 2025-05-09
Payer: COMMERCIAL

## 2025-05-09 VITALS
BODY MASS INDEX: 44.41 KG/M2 | SYSTOLIC BLOOD PRESSURE: 114 MMHG | HEART RATE: 97 BPM | RESPIRATION RATE: 14 BRPM | OXYGEN SATURATION: 98 % | DIASTOLIC BLOOD PRESSURE: 75 MMHG | HEIGHT: 68 IN | WEIGHT: 293 LBS | TEMPERATURE: 97.9 F

## 2025-05-09 DIAGNOSIS — K60.2 ANAL FISSURE, UNSPECIFIED: ICD-10-CM

## 2025-05-09 DIAGNOSIS — K61.0 ANAL ABSCESS: ICD-10-CM

## 2025-05-09 PROCEDURE — 99024 POSTOP FOLLOW-UP VISIT: CPT

## 2025-06-11 ENCOUNTER — APPOINTMENT (OUTPATIENT)
Dept: COLORECTAL SURGERY | Facility: CLINIC | Age: 25
End: 2025-06-11

## (undated) DEVICE — SYR LUER LOK 20CC

## (undated) DEVICE — SOL IRR POUR NS 0.9% 1000ML

## (undated) DEVICE — CATH NG SALEM SUMP 16FR

## (undated) DEVICE — VENODYNE/SCD SLEEVE CALF LARGE

## (undated) DEVICE — S&N ARTHROCARE ENT WAND PROCISE XP

## (undated) DEVICE — DRSG PAD EYE OVAL

## (undated) DEVICE — SOL IRR POUR H2O 1000ML

## (undated) DEVICE — ELCTR BOVIE SUCTION 10FR

## (undated) DEVICE — PLV-SCD MACHINE: Type: DURABLE MEDICAL EQUIPMENT

## (undated) DEVICE — WARMING BLANKET LOWER ADULT

## (undated) DEVICE — NDL HYPO REGULAR BEVEL 25G X 1.5" (BLUE)

## (undated) DEVICE — SOL INJ NS 0.9% 500ML 2 PORT

## (undated) DEVICE — CATH NG SUMP SALEM 18FR X 48"

## (undated) DEVICE — CATH NG SALEM SUMP 12FR

## (undated) DEVICE — GLV 8 PROTEXIS (WHITE)

## (undated) DEVICE — SPECIMEN CONTAINER 4OZ

## (undated) DEVICE — CATH NG SALEM SUMP 14FR

## (undated) DEVICE — DRAPE TOWEL BLUE 17" X 24"

## (undated) DEVICE — PACK T & A

## (undated) DEVICE — VENODYNE/SCD SLEEVE CALF MEDIUM

## (undated) DEVICE — CATH NG SALEM SUMP 10FR

## (undated) DEVICE — SPONGE TONSIL BALL LG

## (undated) DEVICE — DRAPE INSTRUMENT POUCH 6.75" X 11"